# Patient Record
Sex: FEMALE | Race: WHITE | NOT HISPANIC OR LATINO | URBAN - METROPOLITAN AREA
[De-identification: names, ages, dates, MRNs, and addresses within clinical notes are randomized per-mention and may not be internally consistent; named-entity substitution may affect disease eponyms.]

---

## 2020-10-09 ENCOUNTER — APPOINTMENT (OUTPATIENT)
Dept: URBAN - METROPOLITAN AREA SURGERY 9 | Age: 35
Setting detail: DERMATOLOGY
End: 2020-10-09

## 2020-10-09 DIAGNOSIS — L91.8 OTHER HYPERTROPHIC DISORDERS OF THE SKIN: ICD-10-CM

## 2020-10-09 DIAGNOSIS — D22 MELANOCYTIC NEVI: ICD-10-CM

## 2020-10-09 DIAGNOSIS — L82.1 OTHER SEBORRHEIC KERATOSIS: ICD-10-CM

## 2020-10-09 PROBLEM — D22.61 MELANOCYTIC NEVI OF RIGHT UPPER LIMB, INCLUDING SHOULDER: Status: ACTIVE | Noted: 2020-10-09

## 2020-10-09 PROBLEM — D22.72 MELANOCYTIC NEVI OF LEFT LOWER LIMB, INCLUDING HIP: Status: ACTIVE | Noted: 2020-10-09

## 2020-10-09 PROBLEM — D22.62 MELANOCYTIC NEVI OF LEFT UPPER LIMB, INCLUDING SHOULDER: Status: ACTIVE | Noted: 2020-10-09

## 2020-10-09 PROBLEM — D22.71 MELANOCYTIC NEVI OF RIGHT LOWER LIMB, INCLUDING HIP: Status: ACTIVE | Noted: 2020-10-09

## 2020-10-09 PROBLEM — D22.39 MELANOCYTIC NEVI OF OTHER PARTS OF FACE: Status: ACTIVE | Noted: 2020-10-09

## 2020-10-09 PROBLEM — D22.5 MELANOCYTIC NEVI OF TRUNK: Status: ACTIVE | Noted: 2020-10-09

## 2020-10-09 PROCEDURE — OTHER SKIN TAG REMOVAL MULTI (COSMETIC): OTHER

## 2020-10-09 PROCEDURE — OTHER REASSURANCE: OTHER

## 2020-10-09 PROCEDURE — 99202 OFFICE O/P NEW SF 15 MIN: CPT

## 2020-10-09 PROCEDURE — OTHER LIQUID NITROGEN (COSMETIC): OTHER

## 2020-10-09 PROCEDURE — OTHER COUNSELING: OTHER

## 2020-10-09 PROCEDURE — OTHER OTHER: OTHER

## 2020-10-09 ASSESSMENT — LOCATION DETAILED DESCRIPTION DERM
LOCATION DETAILED: SUPERIOR THORACIC SPINE
LOCATION DETAILED: RIGHT SUPERIOR LATERAL NECK
LOCATION DETAILED: LEFT PROXIMAL PRETIBIAL REGION
LOCATION DETAILED: LEFT CENTRAL MALAR CHEEK
LOCATION DETAILED: LEFT PROXIMAL POSTERIOR UPPER ARM
LOCATION DETAILED: LEFT SUPERIOR LATERAL NECK
LOCATION DETAILED: LEFT SUPERIOR CENTRAL BUCCAL CHEEK
LOCATION DETAILED: RIGHT DISTAL POSTERIOR UPPER ARM
LOCATION DETAILED: RIGHT PROXIMAL PRETIBIAL REGION

## 2020-10-09 ASSESSMENT — LOCATION SIMPLE DESCRIPTION DERM
LOCATION SIMPLE: RIGHT PRETIBIAL REGION
LOCATION SIMPLE: LEFT PRETIBIAL REGION
LOCATION SIMPLE: LEFT ANTERIOR NECK
LOCATION SIMPLE: RIGHT ANTERIOR NECK
LOCATION SIMPLE: LEFT CHEEK
LOCATION SIMPLE: RIGHT POSTERIOR UPPER ARM
LOCATION SIMPLE: LEFT POSTERIOR UPPER ARM
LOCATION SIMPLE: UPPER BACK

## 2020-10-09 ASSESSMENT — LOCATION ZONE DERM
LOCATION ZONE: TRUNK
LOCATION ZONE: ARM
LOCATION ZONE: NECK
LOCATION ZONE: FACE
LOCATION ZONE: LEG

## 2020-10-09 NOTE — PROCEDURE: OTHER
Other (Free Text): Cost included as listed above
Detail Level: Simple
Note Text (......Xxx Chief Complaint.): This diagnosis correlates with the

## 2020-10-09 NOTE — PROCEDURE: SKIN TAG REMOVAL MULTI (COSMETIC)
Detail Level: Zone
Consent: Written consent obtained and the risks of skin tag removal was reviewed with the patient including but not limited to bleeding, pigmentary change, infection, pain, and remote possibility of scarring.
Total Number Of Lesions Treated: 23
Hemostasis: Aluminum Chloride and Electrocautery
Removed With: gradle excision
Price (Use Numbers Only, No Special Characters Or $): 0

## 2020-10-09 NOTE — PROCEDURE: LIQUID NITROGEN (COSMETIC)
Consent: The patient's consent was obtained including but not limited to risks of crusting, scabbing, blistering, scarring, darker or lighter pigmentary change, recurrence, incomplete removal and infection. The patient understands that the procedure is cosmetic in nature and is not covered by insurance.
Render Post-Care Instructions In Note?: no
Post-Care Instructions: I reviewed with the patient in detail post-care instructions. Patient is to wear sunprotection, and avoid picking at any of the treated lesions. Pt may apply Vaseline to crusted or scabbing areas.
Billing Information: Bill by Static Price
Detail Level: Zone
Price (Use Numbers Only, No Special Characters Or $): 200

## 2021-11-10 ENCOUNTER — APPOINTMENT (OUTPATIENT)
Dept: URBAN - METROPOLITAN AREA SURGERY 9 | Age: 36
Setting detail: DERMATOLOGY
End: 2021-11-10

## 2021-11-10 DIAGNOSIS — D22 MELANOCYTIC NEVI: ICD-10-CM

## 2021-11-10 DIAGNOSIS — L82.1 OTHER SEBORRHEIC KERATOSIS: ICD-10-CM

## 2021-11-10 DIAGNOSIS — L91.8 OTHER HYPERTROPHIC DISORDERS OF THE SKIN: ICD-10-CM

## 2021-11-10 DIAGNOSIS — I78.1 NEVUS, NON-NEOPLASTIC: ICD-10-CM

## 2021-11-10 PROBLEM — D22.72 MELANOCYTIC NEVI OF LEFT LOWER LIMB, INCLUDING HIP: Status: ACTIVE | Noted: 2021-11-10

## 2021-11-10 PROBLEM — D22.39 MELANOCYTIC NEVI OF OTHER PARTS OF FACE: Status: ACTIVE | Noted: 2021-11-10

## 2021-11-10 PROBLEM — D22.5 MELANOCYTIC NEVI OF TRUNK: Status: ACTIVE | Noted: 2021-11-10

## 2021-11-10 PROBLEM — D22.61 MELANOCYTIC NEVI OF RIGHT UPPER LIMB, INCLUDING SHOULDER: Status: ACTIVE | Noted: 2021-11-10

## 2021-11-10 PROBLEM — D23.39 OTHER BENIGN NEOPLASM OF SKIN OF OTHER PARTS OF FACE: Status: ACTIVE | Noted: 2021-11-10

## 2021-11-10 PROBLEM — D22.71 MELANOCYTIC NEVI OF RIGHT LOWER LIMB, INCLUDING HIP: Status: ACTIVE | Noted: 2021-11-10

## 2021-11-10 PROBLEM — D22.62 MELANOCYTIC NEVI OF LEFT UPPER LIMB, INCLUDING SHOULDER: Status: ACTIVE | Noted: 2021-11-10

## 2021-11-10 PROCEDURE — OTHER SKIN TAG REMOVAL (COSMETIC): OTHER

## 2021-11-10 PROCEDURE — 99213 OFFICE O/P EST LOW 20 MIN: CPT

## 2021-11-10 PROCEDURE — OTHER OTHER: OTHER

## 2021-11-10 PROCEDURE — OTHER COUNSELING: OTHER

## 2021-11-10 PROCEDURE — OTHER LIQUID NITROGEN (COSMETIC): OTHER

## 2021-11-10 PROCEDURE — OTHER REASSURANCE: OTHER

## 2021-11-10 ASSESSMENT — LOCATION DETAILED DESCRIPTION DERM
LOCATION DETAILED: RIGHT ANTERIOR DISTAL THIGH
LOCATION DETAILED: RIGHT INFERIOR CENTRAL MALAR CHEEK
LOCATION DETAILED: RIGHT VENTRAL PROXIMAL FOREARM
LOCATION DETAILED: LEFT CHIN
LOCATION DETAILED: LEFT ANTERIOR PROXIMAL THIGH
LOCATION DETAILED: LEFT VENTRAL PROXIMAL FOREARM
LOCATION DETAILED: EPIGASTRIC SKIN
LOCATION DETAILED: LEFT ANTERIOR DISTAL THIGH
LOCATION DETAILED: LEFT INFERIOR ANTERIOR NECK
LOCATION DETAILED: RIGHT ANTERIOR PROXIMAL THIGH

## 2021-11-10 ASSESSMENT — LOCATION ZONE DERM
LOCATION ZONE: LEG
LOCATION ZONE: ARM
LOCATION ZONE: TRUNK
LOCATION ZONE: NECK
LOCATION ZONE: FACE

## 2021-11-10 ASSESSMENT — LOCATION SIMPLE DESCRIPTION DERM
LOCATION SIMPLE: CHIN
LOCATION SIMPLE: ABDOMEN
LOCATION SIMPLE: LEFT FOREARM
LOCATION SIMPLE: LEFT THIGH
LOCATION SIMPLE: RIGHT CHEEK
LOCATION SIMPLE: RIGHT THIGH
LOCATION SIMPLE: RIGHT FOREARM
LOCATION SIMPLE: LEFT ANTERIOR NECK

## 2021-11-10 NOTE — PROCEDURE: REASSURANCE
Additional Notes (Optional): Due to location and risk for scarring would not recommend removal.
Detail Level: Simple
Hide Additional Notes?: No

## 2021-11-10 NOTE — PROCEDURE: SKIN TAG REMOVAL (COSMETIC)
Price (Use Numbers Only, No Special Characters Or $): 0
Anesthesia Type: 1% lidocaine with epinephrine
Anesthesia Volume In Cc: 0.1
Detail Level: Detailed
Hemostasis: Aluminum Chloride and Electrocautery
Removed With: gradle excision
Consent: Written consent obtained and the risks of skin tag removal was reviewed with the patient including but not limited to bleeding, pigmentary change, infection, pain, and remote possibility of scarring.

## 2021-11-10 NOTE — PROCEDURE: OTHER
Other (Free Text): Can follow up with [facility], information provided.
Detail Level: Zone
Render Risk Assessment In Note?: no
Note Text (......Xxx Chief Complaint.): This diagnosis correlates with the

## 2021-11-10 NOTE — PROCEDURE: LIQUID NITROGEN (COSMETIC)
Price (Use Numbers Only, No Special Characters Or $): 75
Render Post-Care Instructions In Note?: no
Post-Care Instructions: I reviewed with the patient in detail post-care instructions. Patient is to wear sunprotection, and avoid picking at any of the treated lesions. Pt may apply Vaseline to crusted or scabbing areas.
Detail Level: Simple
Billing Information: Bill by Static Price
Consent: The patient's consent was obtained including but not limited to risks of crusting, scabbing, blistering, scarring, darker or lighter pigmentary change, recurrence, incomplete removal and infection. The patient understands that the procedure is cosmetic in nature and is not covered by insurance.
Price (Use Numbers Only, No Special Characters Or $): 0

## 2022-11-23 ENCOUNTER — APPOINTMENT (OUTPATIENT)
Dept: URBAN - METROPOLITAN AREA SURGERY 9 | Age: 37
Setting detail: DERMATOLOGY
End: 2022-11-23

## 2022-11-23 DIAGNOSIS — L82.1 OTHER SEBORRHEIC KERATOSIS: ICD-10-CM

## 2022-11-23 DIAGNOSIS — D22 MELANOCYTIC NEVI: ICD-10-CM

## 2022-11-23 PROBLEM — D22.62 MELANOCYTIC NEVI OF LEFT UPPER LIMB, INCLUDING SHOULDER: Status: ACTIVE | Noted: 2022-11-23

## 2022-11-23 PROBLEM — D22.5 MELANOCYTIC NEVI OF TRUNK: Status: ACTIVE | Noted: 2022-11-23

## 2022-11-23 PROBLEM — D22.39 MELANOCYTIC NEVI OF OTHER PARTS OF FACE: Status: ACTIVE | Noted: 2022-11-23

## 2022-11-23 PROBLEM — D22.71 MELANOCYTIC NEVI OF RIGHT LOWER LIMB, INCLUDING HIP: Status: ACTIVE | Noted: 2022-11-23

## 2022-11-23 PROBLEM — D22.61 MELANOCYTIC NEVI OF RIGHT UPPER LIMB, INCLUDING SHOULDER: Status: ACTIVE | Noted: 2022-11-23

## 2022-11-23 PROBLEM — D22.72 MELANOCYTIC NEVI OF LEFT LOWER LIMB, INCLUDING HIP: Status: ACTIVE | Noted: 2022-11-23

## 2022-11-23 PROCEDURE — OTHER MIPS QUALITY: OTHER

## 2022-11-23 PROCEDURE — OTHER COUNSELING: OTHER

## 2022-11-23 PROCEDURE — OTHER PATHOLOGY BILLING (COSMETIC): OTHER

## 2022-11-23 PROCEDURE — OTHER LIQUID NITROGEN (COSMETIC): OTHER

## 2022-11-23 PROCEDURE — OTHER COSMETIC SHAVE REMOVAL: OTHER

## 2022-11-23 PROCEDURE — OTHER REASSURANCE: OTHER

## 2022-11-23 PROCEDURE — 99213 OFFICE O/P EST LOW 20 MIN: CPT

## 2022-11-23 PROCEDURE — OTHER OTHER: OTHER

## 2022-11-23 ASSESSMENT — LOCATION DETAILED DESCRIPTION DERM
LOCATION DETAILED: LEFT ANTERIOR PROXIMAL THIGH
LOCATION DETAILED: LEFT ANTERIOR PROXIMAL UPPER ARM
LOCATION DETAILED: LEFT SUPERIOR ANTERIOR NECK
LOCATION DETAILED: SUPERIOR LUMBAR SPINE
LOCATION DETAILED: RIGHT ANTERIOR PROXIMAL THIGH
LOCATION DETAILED: RIGHT CENTRAL ZYGOMA
LOCATION DETAILED: RIGHT ANTERIOR PROXIMAL UPPER ARM
LOCATION DETAILED: INFERIOR THORACIC SPINE
LOCATION DETAILED: LEFT LATERAL SUPERIOR EYELID

## 2022-11-23 ASSESSMENT — LOCATION SIMPLE DESCRIPTION DERM
LOCATION SIMPLE: LOWER BACK
LOCATION SIMPLE: RIGHT UPPER ARM
LOCATION SIMPLE: LEFT THIGH
LOCATION SIMPLE: UPPER BACK
LOCATION SIMPLE: RIGHT THIGH
LOCATION SIMPLE: LEFT SUPERIOR EYELID
LOCATION SIMPLE: RIGHT ZYGOMA
LOCATION SIMPLE: LEFT ANTERIOR NECK
LOCATION SIMPLE: LEFT UPPER ARM

## 2022-11-23 ASSESSMENT — LOCATION ZONE DERM
LOCATION ZONE: ARM
LOCATION ZONE: EYELID
LOCATION ZONE: NECK
LOCATION ZONE: TRUNK
LOCATION ZONE: LEG
LOCATION ZONE: FACE

## 2022-11-23 NOTE — PROCEDURE: OTHER
Detail Level: Simple
Other (Free Text): $170 cosmetic fee (for biopsy and path)
Note Text (......Xxx Chief Complaint.): This diagnosis correlates with the
Render Risk Assessment In Note?: no
Other (Free Text): $75 cosmetic fee

## 2022-11-23 NOTE — PROCEDURE: LIQUID NITROGEN (COSMETIC)
Post-Care Instructions: I reviewed with the patient in detail post-care instructions. Patient is to wear sunprotection, and avoid picking at any of the treated lesions. Pt may apply Vaseline to crusted or scabbing areas.
Total Number Of Lesions Treated: 2
Detail Level: Simple
Render Post Care In The Note?: yes
Consent: The patient's consent was obtained including but not limited to risks of crusting, scabbing, blistering, scarring, darker or lighter pigmentary change, recurrence, incomplete removal and infection.
Duration Of Freeze Thaw-Cycle (Seconds): 0

## 2023-01-04 ENCOUNTER — TELEPHONE (OUTPATIENT)
Dept: CARDIOLOGY CLINIC | Age: 38
End: 2023-01-04

## 2023-01-13 ENCOUNTER — PROCEDURE VISIT (OUTPATIENT)
Dept: CARDIOLOGY CLINIC | Age: 38
End: 2023-01-13

## 2023-01-13 ENCOUNTER — INITIAL CONSULT (OUTPATIENT)
Dept: CARDIOLOGY CLINIC | Age: 38
End: 2023-01-13

## 2023-01-13 VITALS
HEIGHT: 70 IN | SYSTOLIC BLOOD PRESSURE: 114 MMHG | WEIGHT: 180 LBS | HEART RATE: 74 BPM | BODY MASS INDEX: 25.77 KG/M2 | DIASTOLIC BLOOD PRESSURE: 80 MMHG

## 2023-01-13 DIAGNOSIS — R00.2 PALPITATIONS: ICD-10-CM

## 2023-01-13 DIAGNOSIS — I47.1 SVT (SUPRAVENTRICULAR TACHYCARDIA) (HCC): Primary | ICD-10-CM

## 2023-01-13 ASSESSMENT — ENCOUNTER SYMPTOMS
WHEEZING: 0
BACK PAIN: 0
CONSTIPATION: 0
COLOR CHANGE: 0
BLOOD IN STOOL: 0
PHOTOPHOBIA: 0
NAUSEA: 0
SHORTNESS OF BREATH: 1
EYE PAIN: 0
ABDOMINAL PAIN: 0
DIARRHEA: 0
COUGH: 0
VOMITING: 0
CHEST TIGHTNESS: 0

## 2023-01-13 NOTE — PROGRESS NOTES
Patient here in office and educated on EP Study/SVT Ablation, schedule for 2/15/2023 @ 9732, with arrival @ [phone], @ [facility]; risk explained; and consents signed. Also copy of orders given for labs and CXR due STAT on arrival at [facility]. Instruction given to patient to :  NPO after midnight the night before procedure; call hospital at [phone] to pre-register. May take rest of morning meds of procedure  Patient voiced understanding. Copies of consent & info scanned in chart.

## 2023-01-13 NOTE — PROGRESS NOTES
Electrophysiology Consult Note      Reason for consultation:  SVT    Chief complaint : Palpitations    Referring physician: Erica Castillo (Anesthesia)      Primary care physician: Yovana Sethi MD      History of Present Illness:     20-year-old female with no significant past medical history here for SVT management. Patient reports on and off palpitations since she was young. Patient had Holter monitors placed before but never been caught on the monitor. Patient has palpitations last event on apple watch she had the heart rate go up to 230 bpm.  Patient had dizziness and shortness of breath with palpitations. Patient reports episodes are lasting long time during that time most times the last 2 to 5 minutes and they go away with bearing down or relaxing. Patient reports that symptoms started all of a sudden  and also go away all of a sudden. Patient is very active and does exercise regularly. Patient is actually planning to go at [address] [place] [address] next year and she is training for it and she wants to make sure she is safe to train for it as she is having less palpitations again. Patient denies chest pain, syncope. Patient denies sudden cardiac death history in the family. No family history of cardiac disease  She is not on any medicaiton    Patient is  by profession      Pastmedical history: No significant past medical history    Surgical history : No recent surgeries    Family history: No sudden cardiac death    Social history :   Denies smoking. No Known Allergies    Medications : Not on any medications      Review of Systems:   Review of Systems   Constitutional:  Negative for activity change, chills, fatigue and fever. HENT:  Negative for congestion, ear pain and tinnitus. Eyes:  Negative for photophobia, pain and visual disturbance. Respiratory:  Positive for shortness of breath. Negative for cough, chest tightness and wheezing. Cardiovascular:  Positive for palpitations. Negative for chest pain and leg swelling. Gastrointestinal:  Negative for abdominal pain, blood in stool, constipation, diarrhea, nausea and vomiting. Endocrine: Negative for cold intolerance and heat intolerance. Genitourinary:  Negative for dysuria, flank pain and hematuria. Musculoskeletal:  Negative for arthralgias, back pain, myalgias and neck stiffness. Skin:  Negative for color change and rash. Allergic/Immunologic: Negative for food allergies. Neurological:  Positive for dizziness. Negative for light-headedness, numbness and headaches. Hematological:  Does not bruise/bleed easily. Psychiatric/Behavioral:  Negative for agitation, behavioral problems and confusion. Examination:      Vitals:    01/13/23 1336   BP: 114/80   Site: Right Upper Arm   Position: Sitting   Cuff Size: Medium Adult   Pulse: 74   Weight: 180 lb (81.6 kg)   Height: 5' 10\" (1.778 m)        Body mass index is 25.83 kg/m². Physical Exam  Constitutional:       Appearance: Normal appearance. She is not ill-appearing. HENT:      Head: Normocephalic and atraumatic. Mouth/Throat:      Mouth: Mucous membranes are moist.   Eyes:      Conjunctiva/sclera: Conjunctivae normal.   Cardiovascular:      Rate and Rhythm: Normal rate and regular rhythm. Heart sounds: No murmur heard. Pulmonary:      Effort: Pulmonary effort is normal.      Breath sounds: No rales. Abdominal:      General: Abdomen is flat. Palpations: Abdomen is soft. Musculoskeletal:         General: No tenderness. Normal range of motion. Cervical back: Normal range of motion. Right lower leg: No edema. Left lower leg: No edema. Skin:     General: Skin is warm and dry. Neurological:      General: No focal deficit present. Mental Status: She is alert and oriented to person, place, and time.        CBC: No results found for: WBC, HGB, HCT, PLT  Lipids:No results found for: CHOL, TRIG, HDL, LDLCALC, LDLDIRECT  PT/INR: No results found for: INR     BMP:  No results found for: NA, K, CL, CO2, BUN, GLU  CMP: No results found for: AST, ALB, PROT, BILITOT, ALKPHOS  TSH:  No results found for: TSH, T4    EKGINTERPRETATION - EKG Interpretation:  sinus rhythm      Vitals:    01/13/23 1336   BP: 114/80   Site: Right Upper Arm   Position: Sitting   Cuff Size: Medium Adult   Pulse: 74   Weight: 180 lb (81.6 kg)   Height: 5' 10\" (1.778 m)          IMPRESSION / RECOMMENDATIONS:     SVT      Patient appears to have short RP tachycardia by what looks like on apple watch -- could be AVNRT but could be atrial tachycardia too. Less likely of AVRT. Will get an echo on the patient to assess for shortness of breath with palpitations and I want to make sure there is no other structural heart issue prior to EP study    I had discussion about pathyophysiology of SVT in detail and discussed about options of treatment - medications vs EP study    EP study is class I recommendation for regular SVT and I discussed about it    I had a thorough discussion with the patient regarding the risks, benefits and alternatives of the EP study/possible ablation procedure. The risks including, but not limited to, vascular injury, bleeding, infection, radiation exposure, injury to cardiac and surrounding structures, injury to the normal conduction system of the heart (possibly requiring a pacemaker), stroke, myocardial infarction and death were all discussed. The patient considered the risks, benefits and alternatives; decided to proceed with the procedure. Thanks again for allowing me to participate in care of this patient. Please call me if you have any questions. With best regards.       Zechariah Henley MD, 1/13/2023 2:09 PM     Please note this report has been partially produced using speech recognition software and may contain errors related to that system including errors in grammar, punctuation, and spelling, as well as words and phrases that may be inappropriate. If there are any questions or concerns please feel free to contact the dictating provider for clarification.

## 2023-01-13 NOTE — LETTER
Kellie Vargas     PROCEDURE: Electrophysiology Study/Supraventricular Tachycardia Ablation      Date of Procedure: 2/15/2023  Time: [address] Time: 4996    Patient Name: Daniel Vela  : 1985  MRN# [id number]    HOSPITAL: [facility])    Call to Pre-[place] at [phone]  2 days before your procedure    x Please have blood work and chest-x-ray done STAT on arrival  at [facility] or [facility].    X Please do not have anything by mouth after midnight prior to or 8 hours  before the procedure.    x You may take your medications with a sip of water in the morning before your procedure or take them with you unless listed below. IMPORTANT NOTICE TO PATIENTS: Prior Authorization  We will contact your insurance for prior authorization for the CPT code related to the procedure it is the patient's responsibility to contact their insurance to ensure they are following their medical plans provisions or requirements! Patient Signature:  _______________________      Staff: Oneida Boateng MA     Staff Given Instructions:___________________________                    Kellie Bailonon:  Electrophysiology Study/Supraventricular Tachycardia Ablation     Date of Procedure: 2/15/2023 Time: [address] Time: 4373    Patient Name: Daniel Vela  : 1985  MRN# [id number]    Day of Procedure Cath Lab Holding area Pre op Orders:    ANTICOAGULATION[de-identified]  NONE     ? IV peripheral saline lock x 2 sites (at least one inpreferred left arm). ? Type & Screen STAT on arrival.  ? If taking Coumadin, PT INR STAT on arrival day of procedure. ? Female patients <=48years of age >> Please do urine HCG test.  ? Diabetic patients >> Accu check Blood sugar check.   ? Document home medications in EPIC and include date and time of last dose.  ? NPO  ? Notify Dr. Bates of abnormal lab results.  ? Chest Prep> Clip hair anterior chest and posterior back.  ? Groin Prep> Clip hair bilateral groins.      Physician Signature:_____________________Date:__________Time:________                                                       *This consent is applicable for 30 days following patient signature*    PROCEDURAL INFORMED CONSENT FOR OPERATION / PROCEDURE    I (We), Elisa Ambrocio authorize, Dr. Jakob Bates    and such assistants as may be selected by him/her, to perform the following operation/procedures:  Electrophysiology Study/Supraventricular Tachycardia Ablation    Note: If unable to obtain consent prior to an emergent procedure, document the emergent reason in the medical record.   This procedure has been explained to my (our) satisfaction and included in the explanation was:   A) the intended benefit, nature, and extent of the procedure to be performed;   B) the significant risks involved and the probability of success;   C) alternative procedures and methods of treatment;   D) the dangers and probable consequences of such alternatives (including no procedure or treatment);   E) the expected consequences of the procedure on my future health;   F) whether other qualified individuals would be performing important surgical tasks and / or whether  would be present to advise or support the procedure.     I (we) understand that there are other risks of infection and other serious complications in the pre-operative/procedural and postoperative/procedural stages of my (our) care.   I (we) have asked all of the questions which I (we) thought were important in deciding whether or not to undergo treatment or diagnosis. These questions have been answered to my (our) satisfaction.   I (we) understand that no assurance can be given that the procedure will be a success, and no guarantee or warranty of success has been given to me  (us).   2. It has been explained to me (us) that during the course of the operation/procedure, unforeseen conditions may be revealed that necessitate extension of the original procedure(s) or different procedure(s) than those set forth in Paragraph 1. I (we) authorize and request that the above-named physician, his/her assistants or his/her designees, perform procedures as necessary and desirable if deemed to be in my (our) best interest.     3. I acknowledge that other health care personnel may be observing this procedure for the purpose of medical education or other specified purposes as may be necessary as requested and/or approved by my (our) physician. 4. I (we) consent to the disposal by the hospital Pathologist of the removed tissue, parts or organs in accordance with hospital policy. 5. I do_____ do not______ consent to the use of a local infiltration pain blocking agent that will be used by my provider/surgical provider to help alleviate pain during my procedure. 6. I do_____ do not_____ consent to an emergent blood transfusion in the case of a life-threatening situation that requires blood components to be administered. This consent is valid for 24 hours from the beginning of the procedure. 7. This patient does _____ or does not ______currently have a DNR status/order. If DNR order is in place, obtain \"Addendum to the Surgical Consent for ALL Patients with a DNR Order\" to address shakira-operative status for limited intervention or DNR suspension.      8. I have read and fully understand the above Consent for Operation/Procedure and that all blanks were completed before I signed the consent.     _______________________________________   _____/____am/pm   Signature of Patient or legal representative Printed Name / Relationship Date / Time   _______________________________________   _____/____ am/pm   Witness to Signature Printed Name Date / Time     Informed consent:   I have provided the explanation described above in section 1 to the patient and/or legal representative. I have provided the patient and/or legal representative with an opportunity to ask any questions about the proposed operation/procedure.     __________________________Dr.Pradeep Ag Fears ____/____ am/pm   Provider / Proceduralist Printed Name Date / Time     Revised 2021                         Henrique Henson     PATIENT NAME:    Bijal Ibarra                          AFV:[birth year]    PROCEDURE:  Electrophysiology Study/Supraventricular Tachycardia Ablation       DATE OF PROCEDURE: 2/15/2023    DIAGNOSIS:   I47.1 , Z01.810, Z79.0             X MAG    X PHOS       X BMP           X CBC     X    PT      X   PTT  X TSH                X     Chest -x-Ray PA & Lateral View          ? PLEASE CALL ABNORMAL RESULTS TO THE REQUESTING PHYSICIAN? ATTENTION PATIENTS:  You do not have to fast for the lab work.           You must go to the [facility], [address] [place] [address] or [facility]         Physician Signature:____________________Date:_________Time:_________                                  Henrique Brown TO SCHEDULE:    PROCEDURE: Electrophysiology Study/Supraventricular Tachycardia Ablation    Patient Name: Bijal Ibarra  : 1985  MRN# [id number]    Home Phone Number: [phone]   Weight:    Wt Readings from Last 3 Encounters:   23 180 lb (81.6 kg)        Insurance: Payor: Marciano Franco / Plan: [facility] / Product Type: *No Product type* /     Date of Procedure: 2/15/2023 Time: 730 Arrival Time: 3697    Diagnosis:  I47.1 (Supraventricular Tachycardia) Allergies: No Known Allergies     1) Call [facility] scheduling ([phone]) or Instant Message  CONFIRMED WITH     PHONE OR   INSTANT MESSAGE  2) PREAUTHORIZATION NUMBER:    Spoke to:      From date:     expiration date:        Franki Gammons, [place]

## 2023-02-14 ENCOUNTER — TELEPHONE (OUTPATIENT)
Dept: CARDIOLOGY CLINIC | Age: 38
End: 2023-02-14

## 2023-02-14 NOTE — TELEPHONE ENCOUNTER
Advised patient per Dr Talmage Hodgkin that pre testing for procedure will be done morning of procedure.  She voiced understanding

## 2023-02-15 ENCOUNTER — APPOINTMENT (OUTPATIENT)
Dept: GENERAL RADIOLOGY | Age: 38
End: 2023-02-15
Attending: INTERNAL MEDICINE
Payer: COMMERCIAL

## 2023-02-15 ENCOUNTER — HOSPITAL ENCOUNTER (OUTPATIENT)
Dept: CARDIAC CATH/INVASIVE PROCEDURES | Age: 38
Discharge: HOME | End: 2023-02-15
Attending: INTERNAL MEDICINE | Admitting: INTERNAL MEDICINE
Payer: COMMERCIAL

## 2023-02-15 VITALS
RESPIRATION RATE: 16 BRPM | HEART RATE: 84 BPM | DIASTOLIC BLOOD PRESSURE: 74 MMHG | WEIGHT: 174 LBS | OXYGEN SATURATION: 100 % | SYSTOLIC BLOOD PRESSURE: 96 MMHG | BODY MASS INDEX: 24.91 KG/M2 | TEMPERATURE: 97.1 F | HEIGHT: 70 IN

## 2023-02-15 LAB
ABO/RH: NORMAL
ANION GAP SERPL CALCULATED.3IONS-SCNC: 10 MMOL/L (ref 4–16)
ANTIBODY SCREEN: NEGATIVE
APTT: 38.5 SECONDS (ref 25.1–37.1)
BASOPHILS ABSOLUTE: 0 K/CU MM
BASOPHILS RELATIVE PERCENT: 0.6 % (ref 0–1)
BUN SERPL-MCNC: 16 MG/DL (ref 6–23)
CALCIUM SERPL-MCNC: 8.8 MG/DL (ref 8.3–10.6)
CHLORIDE BLD-SCNC: 104 MMOL/L (ref 99–110)
CO2: 23 MMOL/L (ref 21–32)
CREAT SERPL-MCNC: 0.7 MG/DL (ref 0.6–1.1)
DIFFERENTIAL TYPE: ABNORMAL
EOSINOPHILS ABSOLUTE: 0.3 K/CU MM
EOSINOPHILS RELATIVE PERCENT: 4.4 % (ref 0–3)
GFR SERPL CREATININE-BSD FRML MDRD: >60 ML/MIN/1.73M2
GLUCOSE SERPL-MCNC: 91 MG/DL (ref 70–99)
HCT VFR BLD CALC: 34.9 % (ref 37–47)
HEMOGLOBIN: 12.2 GM/DL (ref 12.5–16)
IMMATURE NEUTROPHIL %: 0.3 % (ref 0–0.43)
INR BLD: 1.01 INDEX
LYMPHOCYTES ABSOLUTE: 2.3 K/CU MM
LYMPHOCYTES RELATIVE PERCENT: 34.5 % (ref 24–44)
MAGNESIUM: 2.1 MG/DL (ref 1.8–2.4)
MCH RBC QN AUTO: 31.9 PG (ref 27–31)
MCHC RBC AUTO-ENTMCNC: 35 % (ref 32–36)
MCV RBC AUTO: 91.1 FL (ref 78–100)
MONOCYTES ABSOLUTE: 0.7 K/CU MM
MONOCYTES RELATIVE PERCENT: 10.4 % (ref 0–4)
NUCLEATED RBC %: 0 %
PDW BLD-RTO: 11.9 % (ref 11.7–14.9)
PHOSPHORUS: 3.3 MG/DL (ref 2.5–4.9)
PLATELET # BLD: 295 K/CU MM (ref 140–440)
PMV BLD AUTO: 9.9 FL (ref 7.5–11.1)
POTASSIUM SERPL-SCNC: 3.9 MMOL/L (ref 3.5–5.1)
PREGNANCY TEST URINE, POC: NEGATIVE
PROTHROMBIN TIME: 13 SECONDS (ref 11.7–14.5)
RBC # BLD: 3.83 M/CU MM (ref 4.2–5.4)
SARS-COV-2 RDRP RESP QL NAA+PROBE: NOT DETECTED
SEGMENTED NEUTROPHILS ABSOLUTE COUNT: 3.3 K/CU MM
SEGMENTED NEUTROPHILS RELATIVE PERCENT: 49.8 % (ref 36–66)
SODIUM BLD-SCNC: 137 MMOL/L (ref 135–145)
SOURCE: NORMAL
TOTAL IMMATURE NEUTOROPHIL: 0.02 K/CU MM
TOTAL NUCLEATED RBC: 0 K/CU MM
WBC # BLD: 6.6 K/CU MM (ref 4–10.5)

## 2023-02-15 PROCEDURE — 85730 THROMBOPLASTIN TIME PARTIAL: CPT

## 2023-02-15 PROCEDURE — 86850 RBC ANTIBODY SCREEN: CPT

## 2023-02-15 PROCEDURE — 2580000003 HC RX 258

## 2023-02-15 PROCEDURE — 86901 BLOOD TYPING SEROLOGIC RH(D): CPT

## 2023-02-15 PROCEDURE — C1894 INTRO/SHEATH, NON-LASER: HCPCS

## 2023-02-15 PROCEDURE — 93623 PRGRMD STIMJ&PACG IV RX NFS: CPT | Performed by: INTERNAL MEDICINE

## 2023-02-15 PROCEDURE — 2500000003 HC RX 250 WO HCPCS

## 2023-02-15 PROCEDURE — 81025 URINE PREGNANCY TEST: CPT

## 2023-02-15 PROCEDURE — 80048 BASIC METABOLIC PNL TOTAL CA: CPT

## 2023-02-15 PROCEDURE — C1732 CATH, EP, DIAG/ABL, 3D/VECT: HCPCS

## 2023-02-15 PROCEDURE — 85025 COMPLETE CBC W/AUTO DIFF WBC: CPT

## 2023-02-15 PROCEDURE — 71045 X-RAY EXAM CHEST 1 VIEW: CPT

## 2023-02-15 PROCEDURE — 85610 PROTHROMBIN TIME: CPT

## 2023-02-15 PROCEDURE — C1893 INTRO/SHEATH, FIXED,NON-PEEL: HCPCS

## 2023-02-15 PROCEDURE — C1733 CATH, EP, OTHR THAN COOL-TIP: HCPCS

## 2023-02-15 PROCEDURE — 93653 COMPRE EP EVAL TX SVT: CPT

## 2023-02-15 PROCEDURE — 6360000002 HC RX W HCPCS

## 2023-02-15 PROCEDURE — C1889 IMPLANT/INSERT DEVICE, NOC: HCPCS

## 2023-02-15 PROCEDURE — 87635 SARS-COV-2 COVID-19 AMP PRB: CPT

## 2023-02-15 PROCEDURE — 86900 BLOOD TYPING SEROLOGIC ABO: CPT

## 2023-02-15 PROCEDURE — 83735 ASSAY OF MAGNESIUM: CPT

## 2023-02-15 PROCEDURE — C1730 CATH, EP, 19 OR FEW ELECT: HCPCS

## 2023-02-15 PROCEDURE — 2709999900 HC NON-CHARGEABLE SUPPLY

## 2023-02-15 PROCEDURE — 93653 COMPRE EP EVAL TX SVT: CPT | Performed by: INTERNAL MEDICINE

## 2023-02-15 PROCEDURE — 84100 ASSAY OF PHOSPHORUS: CPT

## 2023-02-15 RX ORDER — ASPIRIN 81 MG/1
81 TABLET ORAL DAILY
Qty: 30 TABLET | Refills: 0 | Status: SHIPPED | OUTPATIENT
Start: 2023-02-15

## 2023-02-15 ASSESSMENT — ENCOUNTER SYMPTOMS
EYE PAIN: 0
PHOTOPHOBIA: 0
CHEST TIGHTNESS: 0
COUGH: 0
BLOOD IN STOOL: 0
SHORTNESS OF BREATH: 1
CONSTIPATION: 0
ABDOMINAL PAIN: 0
VOMITING: 0
COLOR CHANGE: 0
NAUSEA: 0
DIARRHEA: 0
WHEEZING: 0
BACK PAIN: 0

## 2023-02-15 NOTE — DISCHARGE INSTRUCTIONS
Discharge Home Instuctions  Name:Elisa Ambrocio  PFI:[birth year]    Your instructions:    Shower only for the first 5 days, NO TUB BATHS. Wash procedure site with mild soap, rinse and pat dry. Do not rub over the procedure site for two (2) days. Remove dressing and replace with a band-aid in 2 days. Site observations-Observe the area for bleeding or hematoma (lump under the skin caused by bleeding.)      A. Painless bruising is normal.  B. If a firmness/swelling seems to be increasing or there is bright red bleeding from site       (hold pressure over procedure site) and  CALL 911 IMMEDIATELY. What to do after you leave the hospital:    Recommended activity: no lifting, Driving, or Strenuous exercise for 3 days. DO NOT lift more than 10lbs. For your procedure you may have been given a sedative to help you relax. This drug will make you sleepy. It is usually given in a vein (by IV). Don't do anything for 24 hours that requires attention to detail. It takes time for the medicine effects to completely wear off. Do not sign any legally binding contracts or documents over the next 24 hours. For your safety, you should not drive or operate any machinery that could be dangerous until the medicine wears off and you can think clearly and react easily. Follow-up with physician in 7-10 days. Take your medication as ordered.       If you have any questions, you may call [phone] or your physicians office

## 2023-02-15 NOTE — PROGRESS NOTES
Lunch tray set up, family at bedside. Bilateral insertion sites checked. No oozing or hematoma noted.

## 2023-02-15 NOTE — PROGRESS NOTES
Outpatient Pharmacy Progress Note for Meds-to-Beds    Total number of Prescriptions Filled: 1  The following medications were dispensed to the patient during the discharge process:  Aspirin 81mg    Additional Documentation:  Medication(s) were delivered to the patient's room prior to discharge      Thank you for letting us serve your patients.   [facility]    [address], [address] [facility]    Phone: [phone]    Fax: [phone]

## 2023-02-15 NOTE — PROGRESS NOTES
Discharge instructions reviewed with patient. Voices understanding. Procedure sites remain free from active bleeding or hematoma.

## 2023-02-15 NOTE — PROGRESS NOTES
Bedrest completed. Assisted patient out of bed and ambulated to restroom. Patient denies any complaints. Bilateral groin sites checked and sites wnl, no bleeding or hematoma noted. Call light within reach.

## 2023-02-15 NOTE — H&P
Electrophysiology H&p  Note      Reason for consultation:  SVT    Chief complaint : here for EP study and possible ablation    Referring physician: Chrystal Bradley (Anesthesia)      Primary care physician: Margaret Espinosa MD      History of Present Illness: Today visit (02/15/23)    Patient here for Ep study and possible ablation. No change in H&P noted from previous clinic visit. Previous visit:     73-year-old female with no significant past medical history here for SVT management. Patient reports on and off palpitations since she was young. Patient had Holter monitors placed before but never been caught on the monitor. Patient has palpitations last event on apple watch she had the heart rate go up to 230 bpm.  Patient had dizziness and shortness of breath with palpitations. Patient reports episodes are lasting long time during that time most times the last 2 to 5 minutes and they go away with bearing down or relaxing. Patient reports that symptoms started all of a sudden  and also go away all of a sudden. Patient is very active and does exercise regularly. Patient is actually planning to go at [address] [place] [address] next year and she is training for it and she wants to make sure she is safe to train for it as she is having less palpitations again. Patient denies chest pain, syncope. Patient denies sudden cardiac death history in the family. No family history of cardiac disease  She is not on any medicaiton    Patient is  by profession      Pastmedical history: No significant past medical history    Surgical history : No recent surgeries    Family history: No sudden cardiac death    Social history :   Denies smoking. No Known Allergies    Medications : Not on any medications      Review of Systems:   Review of Systems   Constitutional:  Negative for activity change, chills, fatigue and fever.    HENT:  Negative for congestion, ear pain and tinnitus. Eyes:  Negative for photophobia, pain and visual disturbance. Respiratory:  Positive for shortness of breath. Negative for cough, chest tightness and wheezing. Cardiovascular:  Positive for palpitations. Negative for chest pain and leg swelling. Gastrointestinal:  Negative for abdominal pain, blood in stool, constipation, diarrhea, nausea and vomiting. Endocrine: Negative for cold intolerance and heat intolerance. Genitourinary:  Negative for dysuria, flank pain and hematuria. Musculoskeletal:  Negative for arthralgias, back pain, myalgias and neck stiffness. Skin:  Negative for color change and rash. Allergic/Immunologic: Negative for food allergies. Neurological:  Positive for dizziness. Negative for light-headedness, numbness and headaches. Hematological:  Does not bruise/bleed easily. Psychiatric/Behavioral:  Negative for agitation, behavioral problems and confusion. Examination:      Vitals:    02/15/23 0616   BP: 122/86   Pulse: 73   Resp: 14   Temp: 97.1 °F (36.2 °C)   TempSrc: Temporal   SpO2: 98%   Weight: 174 lb (78.9 kg)   Height: 5' 10\" (1.778 m)        Body mass index is 24.97 kg/m². Physical Exam  Constitutional:       Appearance: Normal appearance. She is not ill-appearing. HENT:      Head: Normocephalic and atraumatic. Mouth/Throat:      Mouth: Mucous membranes are moist.   Eyes:      Conjunctiva/sclera: Conjunctivae normal.   Cardiovascular:      Rate and Rhythm: Normal rate and regular rhythm. Heart sounds: No murmur heard. Pulmonary:      Effort: Pulmonary effort is normal.      Breath sounds: No rales. Abdominal:      General: Abdomen is flat. Palpations: Abdomen is soft. Musculoskeletal:         General: No tenderness. Normal range of motion. Cervical back: Normal range of motion. Right lower leg: No edema. Left lower leg: No edema. Skin:     General: Skin is warm and dry.    Neurological:      General: No focal deficit present. Mental Status: She is alert and oriented to person, place, and time. CBC:   Lab Results   Component Value Date/Time    WBC 6.6 02/15/2023 06:25 AM    HGB 12.2 02/15/2023 06:25 AM    HCT 34.9 02/15/2023 06:25 AM     02/15/2023 06:25 AM     Lipids:No results found for: CHOL, TRIG, HDL, LDLCALC, LDLDIRECT  PT/INR:   Lab Results   Component Value Date/Time    INR 1.01 02/15/2023 06:25 AM        BMP:    Lab Results   Component Value Date     02/15/2023    K 3.9 02/15/2023     02/15/2023    CO2 23 02/15/2023    BUN 16 02/15/2023     CMP: No results found for: AST, ALB, PROT, BILITOT, ALKPHOS  TSH:  No results found for: TSH, T4    EKGINTERPRETATION - EKG Interpretation:  sinus rhythm      Vitals:    02/15/23 0616   BP: 122/86   Pulse: 73   Resp: 14   Temp: 97.1 °F (36.2 °C)   TempSrc: Temporal   SpO2: 98%   Weight: 174 lb (78.9 kg)   Height: 5' 10\" (1.778 m)          IMPRESSION / RECOMMENDATIONS:     SVT      Patient appears to have short RP tachycardia by what looks like on apple watch -- could be AVNRT but could be atrial tachycardia too. Less likely of AVRT. Will get an echo on the patient to assess for shortness of breath with palpitations and I want to make sure there is no other structural heart issue prior to EP study    I had discussion about pathyophysiology of SVT in detail and discussed about options of treatment - medications vs EP study    EP study is class I recommendation for regular SVT and I discussed about it    I had a thorough discussion with the patient regarding the risks, benefits and alternatives of the EP study/possible ablation procedure. The risks including, but not limited to, vascular injury, bleeding, infection, radiation exposure, injury to cardiac and surrounding structures, injury to the normal conduction system of the heart (possibly requiring a pacemaker), stroke, myocardial infarction and death were all discussed.  The patient considered the risks, benefits and alternatives; decided to proceed with the procedure. Thanks again for allowing me to participate in care of this patient. Please call me if you have any questions. With best regards. Hong Chavez MD, 2/15/2023 7:40 AM     Please note this report has been partially produced using speech recognition software and may contain errors related to that system including errors in grammar, punctuation, and spelling, as well as words and phrases that may be inappropriate. If there are any questions or concerns please feel free to contact the dictating provider for clarification.

## 2023-02-15 NOTE — OP NOTE
Procedure:     1. Comprehensive electrophysiologic evaluation including insertion and repositioning of multiple electrode catheters with induction or attempted induction of an arrhythmia with right atrial pacing and recording, right ventricular pacing and recording (when necessary), His bundle recording (when necessary) with intracardiac catheter ablation of arrhythmogenic focus; with treatment of supraventricular tachycardia by ablation of slow atrioventricular pathway    2. Left atrial pacing and recording from coronary sinus     3. Programmed stimulation and pacing after intravenous drug infusion     4. Intracardiac electrophysiologic three-dimensional electro-anatomical mapping        Attending: Luis Alberto Martin MD    Complications: None    Estimated blood loss: 15cc    Anesthesia: Moderate sedation - versed and fentanyl for sheath pull only    Medications used for induction/testing: isoproterenol     Other medications: None    History:  Patient with recurrent palpitations and SVT on the apple watch monitoring here for EP study and possible ablation    Procedure in detail:    The patient was brought to the electrophysiology laboratory in stable condition. The patient was in a fasting, nonsedated state. The risks, benefits and alternatives of the procedure were discussed with the patient in details. The risks that include, but are not limited to, the risks of bleeding, infection, vascular injury, radiation exposure, injury to cardiac or surrounding structures, injury to the conduction system of the heart potentially leading to a pacemaker insertion, stroke, myocardial infarction and death were all discussed in detail. The patient considered his treatment options and opted to proceed with EP study with possible ablation. Written informed consent was obtained and placed on the chart. At this point, a timeout protocol was completed to identify the patient and the procedure being performed.   The patient was prepped and draped in a sterile fashion. Lidocaine was administered to the left and right groin. Using a modified Seldinger technique, venous access was obtained and sheaths were placed as detailed below. Catheters were then placed under fluoroscopic guidance as detailed below. Sheath and Catheter Placement:      Location Sheath Catheter site   Left femoral vein [address]F [address] 6F Decapolar catheter Coronary sinus   Left femoral vein 6F [address] Quadripolar Dani His   Right femoral vein 6F [address] Quadripolar Dani Right ventricle   Right femoral vein 8F Short and 8.5 F SRO   D-F curve Eamon Non irrigated 4mm [address] Ablation catheter    Replaced [address] Quadripolar Dani after EP study Slow pathway modification      Right atrium       Baseline parameters (ms):    KS interval: 168ms   QRS duration: 93 ms     QT interval: 427 ms    Baseline cycle length: 836 ms    AH interval: 69 ms   HV interval: 45 ms          Sinus node function:    Sinus node function was within normal limits. Atrioventricular lauri function:  Incremental pacing was performed from the Hannah Right atrium and right ventricular apex and the antegrade and retrograde atrioventricular (AV) Wenckebach cycle length was determined. Antegrade AV Wenckebach 470 ms  Retrograde AV Wenckebach 470 ms    Concentric activation on coronary sinus catheter in sinus rhythm and also present on retrograde RV pacing. Parahisian pacing was performed and no obvious pathway noted. Single atrial extrastimuli were delivered following drivetrains of 485LD and 500ms from the high right atrium and the AV lauri effective refractory period (ERP) was determined. Evidence of dual AV lauri pathways was seen.     Drive train (ms) [phone]   AVNERP  Fast pathway ERP  Slow pathway ERP   470  460   470  410   AERP 240 220     Patient appeared to hypervagal tone - which improved with fluids and movement of her legs  Patient was having 2-3 echo beats but did not proceed into SVT and was terminating with an A signal    Ventricular function    Single ventricular extrastimuli were delivered following drivetrains of 023RV and 500ms from the right ventricular apex and the ventricular ERP was determined. Ventricular  ms / 240 ms;     500ms / 240 ms  VAERP : 600/less than 240ms;   500/310ms    Isuprel infusion and Arrhythmia Induction:    Patient did not have sustained tachycadia initially with out isuprel probably because of vagal response. Patient with minimal isuprel infusion went easily into SVT    A supraventricular tachyarrhythmia was induced with programmed stimulation. The induced tachyarrhythmia had a cycle length (TCL) of 320-330ms. The VA time during the tachycardia was 0 ms. Ventricular entrainment was performed and demonstrated a SURINDER response with a long return cycle length (> 115ms + the TCL). In addition, the difference in the stim-atrial EGM time during ventricular entrainment and VA time during SVT was > 85ms. Variation in the tachycardia cycle length demonstrated dependence on HH variation confirming AV lauri participation. 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 3D Mapping:    Cherylann Age CARTO 3 system was used for [email or web address]. Was also helpful in confirming the electro-anatomical/activation mapping. Important anatomical landmarks were marked on the map to avoid complications. Also Ablation spots were marked to able to be precise and focus on the ablation area and avoid collateral damage. 3D mapping was helpful in decreasing the fluoroscopy time. 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   Electrophysiologic mapping was performed to localize an area on the anterior lip of the coronary sinus ostium where an atrial-ventricular ratio of approximately 1:3 could be obtained. Following identification of this area, a lesion was delivered (50 W, 60 C, 60 seconds) with demonstration of accelerated junctional rhythm with maintenance of antegrade and retrograde conduction. Post ablation patient was again inducible on isuprel with aggressive pacing with S4 and appear to have another circuit almost like dual loop. So we ablated the area again at the slow pathway area - little medial and anterior below the his and with demonstration of accelerated junctional rhythm with maintenance of antegrade and retrograde conduction. Drug Infusion and Reinduction:    Following ablation, programmed stimulation was performed on and off of isoproterenol infusion with demonstration of no sustained tachyarrhythmias or double AV lauri echo beats in contrast to prior to the ablation. Up to 3 premature atrial and ventricular beats following a drive train, were brought in up to individual ERP and no arrhythmia was inducible. Following an appropriate waiting period during which success of the ablation was confirmed, catheters were removed. Sheaths were removed and hemostasis achieved with manual compression    The patient was transported to the holding area in stable condition. Summary:  Comprehensive electrophysiologic study with successful modification of slow pathway of AV node for treatment of AV lauri reentrant tachycardia.     Recommendations:  Bedrest x 4 hours with legs straight  Monitor on telemetry  Discharge today         Electronically signed by Chrissy Hart MD on 2/15/2023 at 11:16 AM

## 2023-02-18 ENCOUNTER — TELEPHONE (OUTPATIENT)
Dept: CARDIOLOGY CLINIC | Age: 38
End: 2023-02-18

## 2023-02-18 RX ORDER — COLCHICINE 0.6 MG/1
0.6 TABLET ORAL DAILY
Qty: 7 TABLET | Refills: 0 | Status: SHIPPED | OUTPATIENT
Start: 2023-02-18 | End: 2023-02-25

## 2023-02-20 ENCOUNTER — PROCEDURE VISIT (OUTPATIENT)
Dept: CARDIOLOGY CLINIC | Age: 38
End: 2023-02-20
Payer: COMMERCIAL

## 2023-02-20 ENCOUNTER — OFFICE VISIT (OUTPATIENT)
Dept: CARDIOLOGY CLINIC | Age: 38
End: 2023-02-20
Payer: COMMERCIAL

## 2023-02-20 VITALS
HEIGHT: 70 IN | WEIGHT: 178 LBS | SYSTOLIC BLOOD PRESSURE: 102 MMHG | BODY MASS INDEX: 25.48 KG/M2 | HEART RATE: 98 BPM | DIASTOLIC BLOOD PRESSURE: 74 MMHG | OXYGEN SATURATION: 98 %

## 2023-02-20 DIAGNOSIS — R06.02 SHORTNESS OF BREATH: ICD-10-CM

## 2023-02-20 DIAGNOSIS — I47.1 AVNRT (AV NODAL RE-ENTRY TACHYCARDIA) (HCC): Primary | ICD-10-CM

## 2023-02-20 LAB
LV EF: 58 %
LVEF MODALITY: NORMAL

## 2023-02-20 PROCEDURE — 1036F TOBACCO NON-USER: CPT | Performed by: NURSE PRACTITIONER

## 2023-02-20 PROCEDURE — G8427 DOCREV CUR MEDS BY ELIG CLIN: HCPCS | Performed by: NURSE PRACTITIONER

## 2023-02-20 PROCEDURE — 99213 OFFICE O/P EST LOW 20 MIN: CPT | Performed by: NURSE PRACTITIONER

## 2023-02-20 PROCEDURE — 93308 TTE F-UP OR LMTD: CPT | Performed by: INTERNAL MEDICINE

## 2023-02-20 PROCEDURE — 93000 ELECTROCARDIOGRAM COMPLETE: CPT | Performed by: NURSE PRACTITIONER

## 2023-02-20 PROCEDURE — G8484 FLU IMMUNIZE NO ADMIN: HCPCS | Performed by: NURSE PRACTITIONER

## 2023-02-20 PROCEDURE — G8419 CALC BMI OUT NRM PARAM NOF/U: HCPCS | Performed by: NURSE PRACTITIONER

## 2023-02-20 ASSESSMENT — ENCOUNTER SYMPTOMS
WHEEZING: 0
EYE PAIN: 0
NAUSEA: 0
EYE ITCHING: 0
BACK PAIN: 0
BLOOD IN STOOL: 0
COLOR CHANGE: 0
ABDOMINAL PAIN: 0
PHOTOPHOBIA: 0
ABDOMINAL DISTENTION: 0
CHEST TIGHTNESS: 1
COUGH: 0
DIARRHEA: 0
VOMITING: 0
SINUS PAIN: 0
SHORTNESS OF BREATH: 1
CONSTIPATION: 0
SINUS PRESSURE: 0

## 2023-02-20 NOTE — PATIENT INSTRUCTIONS
Please be informed that if you contact our office outside of normal business hours the physician on call cannot help with any scheduling or rescheduling issues, procedure instruction questions or any type of medication issue.    We advise you for any urgent/emergency that you go to the nearest emergency room!    PLEASE CALL OUR OFFICE DURING NORMAL BUSINESS HOURS    Monday - Friday   8 am to 5 pm    [place]: [phone]    [place]: [phone]    [place]:  [phone]    **It is YOUR responsibilty to bring medication bottles and/or updated medication list to EACH APPOINTMENT. This will allow us to better serve you and all your healthcare needs**    Thank you for allowing us to care for you today!   We want to ensure we can follow your treatment plan and we strive to give you the best outcomes and experience possible.   If you ever have a life threatening emergency and call 911 - for an ambulance (EMS)   Our providers can only care for you at:   [facility] or [facility].   Even if you have someone take you or you drive yourself we can only care for you in a [facility] facility. Our providers are not setup at the other healthcare locations!

## 2023-02-20 NOTE — PROGRESS NOTES
Electrophysiology Follow up  Note      Reason for consultation:  SVT    Chief complaint: chest tightness and shortness of breath with inspiration    Referring physician: Stef Benitez (Anesthesia)      Primary care physician: Richard Medina MD      History of Present Illness: This visit 2/20/2022  Patient is here today with complaints of chest tightness and shortness of breath with inspiration. Patient reports that these symptoms started after the ablation. She did contact Dr Cate Marin and was started on colchicine. She has been taking the medication for 2 days and reports that her symptoms have not improved. She denies lightheadedness, dizziness, edema or syncope. Patient is taking ASA 81 mg daily. Previous visit:     57-year-old female with no significant past medical history here for SVT management. Patient reports on and off palpitations since she was young. Patient had Holter monitors placed before but never been caught on the monitor. Patient has palpitations last event on apple watch she had the heart rate go up to 230 bpm.  Patient had dizziness and shortness of breath with palpitations. Patient reports episodes are lasting long time during that time most times the last 2 to 5 minutes and they go away with bearing down or relaxing. Patient reports that symptoms started all of a sudden  and also go away all of a sudden. Patient is very active and does exercise regularly. Patient is actually planning to go at [address] [place] [address] next year and she is training for it and she wants to make sure she is safe to train for it as she is having less palpitations again. Patient denies chest pain, syncope. Patient denies sudden cardiac death history in the family.   No family history of cardiac disease  She is not on any medicaiton    Patient is  by profession      Pastmedical history: No significant past medical history    Surgical history : No recent surgeries    Family history: No sudden cardiac death    Social history :  reports that she has never smoked. She has never used smokeless tobacco. Denies smoking. No Known Allergies    Medications : Not on any medications      Review of Systems:   Review of Systems   Constitutional:  Negative for activity change, chills, fatigue and fever. HENT:  Negative for congestion, sinus pressure and sinus pain. Eyes:  Negative for photophobia, pain, itching and visual disturbance. Respiratory:  Positive for chest tightness and shortness of breath (with inspiration). Negative for cough and wheezing. Cardiovascular:  Negative for chest pain, palpitations and leg swelling. Gastrointestinal:  Negative for abdominal distention, abdominal pain, blood in stool, constipation, diarrhea, nausea and vomiting. Endocrine: Negative for cold intolerance and heat intolerance. Genitourinary:  Negative for difficulty urinating, dysuria, flank pain and hematuria. Musculoskeletal:  Negative for arthralgias, back pain, myalgias and neck stiffness. Skin:  Negative for color change, rash and wound. Allergic/Immunologic: Negative for food allergies. Neurological:  Negative for dizziness, syncope, light-headedness, numbness and headaches. Hematological:  Does not bruise/bleed easily. Psychiatric/Behavioral:  Negative for agitation, behavioral problems and confusion. The patient is not nervous/anxious. Examination:      Vitals:    02/20/23 1531 02/20/23 1546 02/20/23 1549   BP: 96/60 (!) 86/58 102/74   Site: Left Upper Arm Left Upper Arm Left Upper Arm   Position: Supine Sitting Standing   Cuff Size: Medium Adult Medium Adult Medium Adult   Pulse: 82 89 98   SpO2:  98% 98%   Weight: 178 lb (80.7 kg)     Height: 5' 10\" (1.778 m)          Body mass index is 25.54 kg/m². Physical Exam  Constitutional:       Appearance: Normal appearance. She is not ill-appearing.    HENT:      Head: Normocephalic and atraumatic. Right Ear: External ear normal.      Left Ear: External ear normal.      Nose: Nose normal.      Mouth/Throat:      Mouth: Mucous membranes are moist.      Pharynx: Oropharynx is clear. Eyes:      General:         Right eye: No discharge. Left eye: No discharge. Conjunctiva/sclera: Conjunctivae normal.   Cardiovascular:      Rate and Rhythm: Normal rate and regular rhythm. Pulses: Normal pulses. Heart sounds: Normal heart sounds. No murmur heard. Pulmonary:      Effort: Pulmonary effort is normal.      Breath sounds: Normal breath sounds. No wheezing, rhonchi or rales. Abdominal:      General: Abdomen is flat. Palpations: Abdomen is soft. Musculoskeletal:         General: Normal range of motion. Cervical back: Normal range of motion and neck supple. Right lower leg: No edema. Left lower leg: No edema. Skin:     General: Skin is warm and dry. Comments: Bilateral femoral groin sites soft, nontender, no hematoma   Neurological:      General: No focal deficit present. Mental Status: She is alert and oriented to person, place, and time. Psychiatric:         Mood and Affect: Mood normal.         Thought Content:  Thought content normal.       CBC:   Lab Results   Component Value Date/Time    WBC 6.6 02/15/2023 06:25 AM    HGB 12.2 02/15/2023 06:25 AM    HCT 34.9 02/15/2023 06:25 AM     02/15/2023 06:25 AM     Lipids:No results found for: CHOL, TRIG, HDL, LDLCALC, LDLDIRECT  PT/INR:   Lab Results   Component Value Date/Time    INR 1.01 02/15/2023 06:25 AM        BMP:    Lab Results   Component Value Date     02/15/2023    K 3.9 02/15/2023     02/15/2023    CO2 23 02/15/2023    BUN 16 02/15/2023     CMP: No results found for: AST, ALB, PROT, BILITOT, ALKPHOS  TSH:  No results found for: TSH, T4    EKGINTERPRETATION - EKG Interpretation:  sinus rhythm      Vitals:    02/20/23 1531 02/20/23 1546 02/20/23 1549   BP: 96/60 (!) 86/58 102/74   Site: Left Upper Arm Left Upper Arm Left Upper Arm   Position: Supine Sitting Standing   Cuff Size: Medium Adult Medium Adult Medium Adult   Pulse: 82 89 98   SpO2:  98% 98%   Weight: 178 lb (80.7 kg)     Height: 5' 10\" (1.778 m)            IMPRESSION / RECOMMENDATIONS:     *chest tightness on inspiration  *shortness of breath on inspiration    S/p AVNRT    EKG obtained and reviewed- sinus rhythm noted  No pericarditis noted on EKG  Will get limited echo to rule out pericardial effusion vs thrombus    Addendum:  ECHO: EF 55-60%  No thrombus noted  No pericardial effusion    Results of EKG and ECHO discussed with Dr Karla Liu  Most likely patient's symptoms are a result of the ablation  Will write a script for incentive spirometry   This was discussed with patient who voiced understanding  Patient to continue colchicine for duration of script  Patient to call next week if symptoms do not improve after taking colchicine and using of the incentive spirometry. Patient to continue ASA 81 mg daily    Patient to follow up in 1 month or sooner if needed. Thanks again for allowing me to participate in care of this patient. Please call me if you have any questions. With best regards. Kat Hanks, APRN - CNP, 2/20/2023 4:52 PM     Please note this report has been partially produced using speech recognition software and may contain errors related to that system including errors in grammar, punctuation, and spelling, as well as words and phrases that may be inappropriate. If there are any questions or concerns please feel free to contact the dictating provider for clarification.

## 2023-02-20 NOTE — TELEPHONE ENCOUNTER
Patient called with chest discomfort with deep full breath  off and on. But over all feeling well. Hr IN 70s. She sent a rhythm strip from [employer] I [facility]. Sinus rhythm with no ST changes on single tele strip though  Appears more like pain post ablation  Will start colchicine for 7 days. Over all walking with out discomfort  Encouraged to keep hydration  FU appt on Monday in office at 3:30 pm with Frank Fernandes  Will get EKG, if symptoms persists with get an echo.   Asked patient to call us if symptoms worsen

## 2023-05-08 RX ORDER — DILTIAZEM HYDROCHLORIDE 120 MG/1
120 CAPSULE, COATED, EXTENDED RELEASE ORAL DAILY
Qty: 90 CAPSULE | Refills: 1 | Status: SHIPPED | OUTPATIENT
Start: 2023-05-08

## 2023-05-08 RX ORDER — ASPIRIN 81 MG/1
81 TABLET ORAL DAILY
Qty: 90 TABLET | Refills: 1 | Status: SHIPPED | OUTPATIENT
Start: 2023-05-08

## 2023-05-08 NOTE — PROGRESS NOTES
Patient had episode of tachycardia with heart rate as fast as 200bpm  Will restart Cardizem  mg daily  Will add ASA 81 mg daily  Patient to follow up with Dr Bill Quintana in 1-2 months  Patient reports she has a challenging schedule the next few months and will call the office to schedule.

## 2023-06-07 ENCOUNTER — OFFICE VISIT (OUTPATIENT)
Dept: CARDIOLOGY CLINIC | Age: 38
End: 2023-06-07

## 2023-06-07 VITALS
WEIGHT: 168.2 LBS | SYSTOLIC BLOOD PRESSURE: 122 MMHG | BODY MASS INDEX: 24.08 KG/M2 | HEART RATE: 79 BPM | DIASTOLIC BLOOD PRESSURE: 68 MMHG | HEIGHT: 70 IN

## 2023-06-07 DIAGNOSIS — I47.1 AVNRT (AV NODAL RE-ENTRY TACHYCARDIA) (HCC): ICD-10-CM

## 2023-06-07 DIAGNOSIS — I47.1 SVT (SUPRAVENTRICULAR TACHYCARDIA) (HCC): Primary | ICD-10-CM

## 2023-06-07 ASSESSMENT — ENCOUNTER SYMPTOMS
COLOR CHANGE: 0
ABDOMINAL PAIN: 0
NAUSEA: 0
BLOOD IN STOOL: 0
VOMITING: 0
BACK PAIN: 0
COUGH: 0
DIARRHEA: 0
PHOTOPHOBIA: 0
WHEEZING: 0
SHORTNESS OF BREATH: 1
EYE PAIN: 0
CONSTIPATION: 0
CHEST TIGHTNESS: 0

## 2023-10-06 ENCOUNTER — APPOINTMENT (OUTPATIENT)
Dept: URBAN - METROPOLITAN AREA SURGERY 9 | Age: 38
Setting detail: DERMATOLOGY
End: 2023-10-06

## 2023-10-06 DIAGNOSIS — L82.0 INFLAMED SEBORRHEIC KERATOSIS: ICD-10-CM

## 2023-10-06 DIAGNOSIS — D22 MELANOCYTIC NEVI: ICD-10-CM

## 2023-10-06 DIAGNOSIS — L82.1 OTHER SEBORRHEIC KERATOSIS: ICD-10-CM

## 2023-10-06 PROBLEM — D22.5 MELANOCYTIC NEVI OF TRUNK: Status: ACTIVE | Noted: 2023-10-06

## 2023-10-06 PROBLEM — D22.4 MELANOCYTIC NEVI OF SCALP AND NECK: Status: ACTIVE | Noted: 2023-10-06

## 2023-10-06 PROCEDURE — 11305 SHAVE SKIN LESION 0.5 CM/<: CPT | Mod: 59

## 2023-10-06 PROCEDURE — OTHER REASSURANCE: OTHER

## 2023-10-06 PROCEDURE — 17110 DESTRUCT B9 LESION 1-14: CPT

## 2023-10-06 PROCEDURE — OTHER MIPS QUALITY: OTHER

## 2023-10-06 PROCEDURE — OTHER SHAVE REMOVAL: OTHER

## 2023-10-06 PROCEDURE — OTHER LIQUID NITROGEN: OTHER

## 2023-10-06 PROCEDURE — OTHER COUNSELING: OTHER

## 2023-10-06 PROCEDURE — 99213 OFFICE O/P EST LOW 20 MIN: CPT | Mod: 25

## 2023-10-06 ASSESSMENT — LOCATION ZONE DERM
LOCATION ZONE: TRUNK
LOCATION ZONE: NECK

## 2023-10-06 ASSESSMENT — LOCATION DETAILED DESCRIPTION DERM
LOCATION DETAILED: LEFT SUPERIOR ANTERIOR NECK
LOCATION DETAILED: RIGHT LATERAL UPPER BACK
LOCATION DETAILED: LEFT INFERIOR POSTERIOR NECK
LOCATION DETAILED: SUPERIOR LUMBAR SPINE
LOCATION DETAILED: RIGHT LATERAL BREAST 9-10:00 REGION
LOCATION DETAILED: INFERIOR THORACIC SPINE

## 2023-10-06 ASSESSMENT — LOCATION SIMPLE DESCRIPTION DERM
LOCATION SIMPLE: POSTERIOR NECK
LOCATION SIMPLE: RIGHT BREAST
LOCATION SIMPLE: UPPER BACK
LOCATION SIMPLE: LEFT ANTERIOR NECK
LOCATION SIMPLE: LOWER BACK
LOCATION SIMPLE: RIGHT UPPER BACK

## 2023-10-06 NOTE — PROCEDURE: LIQUID NITROGEN
Medical Necessity Clause: Medical necessity:
Add 52 Modifier (Optional): no
Total Number Of Lesions Treated: 3
Number Of Freeze-Thaw Cycles: 1 freeze-thaw cycle
Consent: Informed consent obtained including but not limited to risks of pain, blistering, possibly permanent pigmentary change, recurrence and incomplete removal. If lesion(s) remain in 1 month, contact office for further evaluation and treatment.
Medical Necessity Information: It is in your best interest to select a reason for this procedure from the list below. All of these items fulfill various CMS LCD requirements except the new and changing color options.
Spray Paint Text: The liquid nitrogen was applied to the skin utilizing a spray paint frosting technique.
Post-care instructions reviewed in detail. May apply Vaseline to crusted or scabbed areas.
Show Spray Paint Technique Variable?: Yes
Duration Of Freeze Thaw-Cycle (Seconds): 15
Detail Level: Detailed

## 2023-11-13 RX ORDER — DILTIAZEM HYDROCHLORIDE 120 MG/1
CAPSULE, COATED, EXTENDED RELEASE ORAL DAILY
Qty: 90 CAPSULE | Refills: 1 | OUTPATIENT
Start: 2023-11-13

## 2023-11-13 RX ORDER — SALICYLIC ACID 40 %
81 ADHESIVE PATCH, MEDICATED TOPICAL DAILY
Qty: 90 TABLET | Refills: 1 | OUTPATIENT
Start: 2023-11-13

## 2023-12-07 RX ORDER — DILTIAZEM HYDROCHLORIDE 120 MG/1
120 CAPSULE, COATED, EXTENDED RELEASE ORAL DAILY
Qty: 90 CAPSULE | Refills: 3 | Status: SHIPPED | OUTPATIENT
Start: 2023-12-07

## 2024-07-26 ENCOUNTER — APPOINTMENT (OUTPATIENT)
Dept: URBAN - METROPOLITAN AREA SURGERY 9 | Age: 39
Setting detail: DERMATOLOGY
End: 2024-07-26

## 2024-07-26 DIAGNOSIS — L50.1 IDIOPATHIC URTICARIA: ICD-10-CM

## 2024-07-26 PROCEDURE — OTHER PRESCRIPTION: OTHER

## 2024-07-26 PROCEDURE — OTHER MIPS QUALITY: OTHER

## 2024-07-26 PROCEDURE — 99214 OFFICE O/P EST MOD 30 MIN: CPT

## 2024-07-26 PROCEDURE — OTHER PRESCRIPTION MEDICATION MANAGEMENT: OTHER

## 2024-07-26 PROCEDURE — OTHER COUNSELING: OTHER

## 2024-07-26 RX ORDER — CLOBETASOL PROPIONATE 0.5 MG/G
CREAM TOPICAL
Qty: 60 | Refills: 2 | Status: ERX | COMMUNITY
Start: 2024-07-26

## 2024-07-26 ASSESSMENT — LOCATION DETAILED DESCRIPTION DERM
LOCATION DETAILED: RIGHT LATERAL ABDOMEN
LOCATION DETAILED: LEFT DISTAL MEDIAL POSTERIOR UPPER ARM
LOCATION DETAILED: RIGHT PROXIMAL LATERAL POSTERIOR UPPER ARM
LOCATION DETAILED: LEFT ANTERIOR PROXIMAL THIGH

## 2024-07-26 ASSESSMENT — LOCATION SIMPLE DESCRIPTION DERM
LOCATION SIMPLE: RIGHT POSTERIOR UPPER ARM
LOCATION SIMPLE: ABDOMEN
LOCATION SIMPLE: LEFT THIGH
LOCATION SIMPLE: LEFT POSTERIOR UPPER ARM

## 2024-07-26 ASSESSMENT — LOCATION ZONE DERM
LOCATION ZONE: TRUNK
LOCATION ZONE: LEG
LOCATION ZONE: ARM

## 2024-07-26 NOTE — HPI: RASH
Is This A New Presentation, Or A Follow-Up?: Rash
Additional History: Patient has paper work of how her rash she said comes and would flare up and after a couple of hours would be gone. That has been going on in the last 3 days and itching all over she mentioned 3 days before the onset she swim at a public pool.

## 2024-07-26 NOTE — PROCEDURE: PRESCRIPTION MEDICATION MANAGEMENT
Initiate Treatment: Clobetasol 0.05% topical cream BID x up to 2 weeks per month prn itchy flares to the body\\nAllegra 2 pills QAM, 1 pill QHS, if not adequately controlled, increase to 2 pills QAM, 2 pills QHS
Detail Level: Zone
Plan: Patient stopped Cardizem 3 days ago per her cardiologist. Advised that if urticaria is drug related, it may take up to 3-4 weeks of being off the medication to see if condition improves. Recommend gentle fragrance free products such as all free and clear laundry detergent, mild cleanser such as Dove and Eucerin cream to moisturize. Will set up referral to an allergist today.
Render In Strict Bullet Format?: No
Continue Regimen: Prednisone course as prescribed by MD

## 2024-10-09 ENCOUNTER — APPOINTMENT (OUTPATIENT)
Dept: URBAN - METROPOLITAN AREA SURGERY 9 | Age: 39
Setting detail: DERMATOLOGY
End: 2024-10-09

## 2024-10-09 DIAGNOSIS — L50.8 OTHER URTICARIA: ICD-10-CM

## 2024-10-09 DIAGNOSIS — D22 MELANOCYTIC NEVI: ICD-10-CM

## 2024-10-09 DIAGNOSIS — L82.1 OTHER SEBORRHEIC KERATOSIS: ICD-10-CM

## 2024-10-09 PROBLEM — D22.5 MELANOCYTIC NEVI OF TRUNK: Status: ACTIVE | Noted: 2024-10-09

## 2024-10-09 PROCEDURE — OTHER COUNSELING: OTHER

## 2024-10-09 PROCEDURE — 99213 OFFICE O/P EST LOW 20 MIN: CPT

## 2024-10-09 PROCEDURE — OTHER MIPS QUALITY: OTHER

## 2024-10-09 PROCEDURE — OTHER REASSURANCE: OTHER

## 2024-10-09 ASSESSMENT — LOCATION DETAILED DESCRIPTION DERM
LOCATION DETAILED: INFERIOR THORACIC SPINE
LOCATION DETAILED: SUPERIOR LUMBAR SPINE

## 2024-10-09 ASSESSMENT — LOCATION SIMPLE DESCRIPTION DERM
LOCATION SIMPLE: UPPER BACK
LOCATION SIMPLE: LOWER BACK

## 2024-10-09 ASSESSMENT — LOCATION ZONE DERM: LOCATION ZONE: TRUNK

## 2024-10-09 NOTE — PROCEDURE: COUNSELING
Detail Level: Generalized
Patient Specific Counseling (Will Not Stick From Patient To Patient): If recurs, resume allegra 2 a day. Allergy testing negative and has resumed cardizem without flaring again. Idiopathic in nature.

## 2024-12-03 RX ORDER — DILTIAZEM HYDROCHLORIDE 120 MG/1
CAPSULE, COATED, EXTENDED RELEASE ORAL DAILY
Qty: 90 CAPSULE | Refills: 3 | Status: SHIPPED | OUTPATIENT
Start: 2024-12-03